# Patient Record
Sex: FEMALE | Race: ASIAN | URBAN - METROPOLITAN AREA
[De-identification: names, ages, dates, MRNs, and addresses within clinical notes are randomized per-mention and may not be internally consistent; named-entity substitution may affect disease eponyms.]

---

## 2023-07-20 ENCOUNTER — OFFICE VISIT (OUTPATIENT)
Dept: FAMILY MEDICINE | Facility: CLINIC | Age: 43
End: 2023-07-20
Payer: COMMERCIAL

## 2023-07-20 VITALS
DIASTOLIC BLOOD PRESSURE: 78 MMHG | WEIGHT: 114 LBS | TEMPERATURE: 97.9 F | HEIGHT: 61 IN | OXYGEN SATURATION: 99 % | BODY MASS INDEX: 21.52 KG/M2 | HEART RATE: 80 BPM | SYSTOLIC BLOOD PRESSURE: 127 MMHG

## 2023-07-20 DIAGNOSIS — K64.4 EXTERNAL HEMORRHOIDS: Primary | ICD-10-CM

## 2023-07-20 DIAGNOSIS — Z87.19 HISTORY OF IBS: ICD-10-CM

## 2023-07-20 DIAGNOSIS — R19.7 DIARRHEA, UNSPECIFIED TYPE: ICD-10-CM

## 2023-07-20 PROCEDURE — 99204 OFFICE O/P NEW MOD 45 MIN: CPT | Performed by: PHYSICIAN ASSISTANT

## 2023-07-20 RX ORDER — METRONIDAZOLE 500 MG/1
TABLET ORAL
COMMUNITY
Start: 2023-07-06

## 2023-07-20 RX ORDER — BENZOCAINE/MENTHOL 6 MG-10 MG
LOZENGE MUCOUS MEMBRANE 2 TIMES DAILY
Qty: 15 G | Refills: 1 | Status: SHIPPED | OUTPATIENT
Start: 2023-07-20

## 2023-07-20 RX ORDER — METRONIDAZOLE 500 MG/1
TABLET ORAL
COMMUNITY
Start: 2023-07-11

## 2023-07-20 RX ORDER — FLUCONAZOLE 150 MG/1
TABLET ORAL
COMMUNITY
Start: 2023-07-05

## 2023-07-20 ASSESSMENT — PAIN SCALES - GENERAL: PAINLEVEL: NO PAIN (0)

## 2023-07-20 NOTE — LETTER
July 20, 2023      Lucius Geiger  [address] [facility] [address]        To Whom It May Concern:    Lucius Geiger was seen on 7/20/23.  Please excuse from work today! Return to work tomorrow with no restrictions.         Sincerely,        Andrés Gray PA-C

## 2023-07-20 NOTE — PROGRESS NOTES
Assessment & Plan     External hemorrhoids  Lesion consistent with external hemorrhoids.  Discussed pathophysiology of development of hemorrhoids.  Recommended treatment with Preparation H twice daily for 2 to 3 weeks.  The key will be stopping her diarrhea which is likely exacerbating this.  See further comments below.  Reviewed signs symptoms that warrant urgent/emergent reevaluation.  Option to see colorectal in the future if needed.  - hydrocortisone (CORTAID) 1 % external cream  Dispense: 15 g; Refill: 1    Diarrhea, unspecified type  Brat diet.  Stay well-hydrated.  May use Pepto-Bismol/Imodium to hopefully help slow down the diarrhea.  Suspect secondary to metronidazole.  Hopefully this resolves soon.  No additional concerning findings which warrant stool testing at this time.    History of IBS  Longstanding history of IBS.  Has never seen a gastroenterologist or colorectal provider.  Hopefully her above symptoms resolve but either way I recommend she establish with a gastroenterologist to address longstanding IBS.  She will be turning 43 soon and discussed updated colorectal cancer screening guidelines.  She plans to complete this hopefully in the fall.      45 minutes spent by me on the date of the encounter doing chart review, review of test results, interpretation of tests, patient visit and documentation          No follow-ups on file.    The likelihood of other entities in the differential is insufficient to justify any further testing for them at this time. This was explained to the patient. The patient was advised that persistent or worsening symptoms would require further evaluation. Patient advised to call the office and if unable to reach to go to the emergency room if they develop any new or worsening symptoms. Expressed understanding and agreement with above stated plan.     Andrés Gray PA-C  [facility]    Cleopatra Geiger is a 42 year old female with a  "history of IBS presenting for the following health issues:  Patient presents with:  Mass: Anal area.    Here today for a mass in her anal region as well as to establish care.  This has been present for approximately 2 to 3 weeks.  History of IBS.  Typically has a soft bowel movement daily.  IBS is more or less been controlled with use of probiotics.  Unfortunately over the past few months has had recurrent episodes of bacterial vaginosis as well as vaginal candidiasis requiring treatment.  Most recently was prescribed Flagyl which caused diarrhea. Following recurrent diarrheal episodes which she is still experiencing noticed this mass in the outer aspect of her anal region.  At least 4-5 bowel movements daily.  Mass can be uncomfortable at times.  Feels like a pressure.  Nonpruritic.  Denies blood.  No blood in her stool.  Denies abdominal pain, nausea, vomiting, fever, chills, night sweats.    Originally from [place].  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  Plans to be moving to [place] soon.    See family history which we reviewed.  Significant family history for different types of cancers.  Maternal grandmother with history of colorectal cancer.  She has never had a colonoscopy.      Review of Systems   Constitutional, HEENT, cardiovascular, pulmonary, GI, , musculoskeletal, neuro, skin, endocrine and psych systems are negative, except as otherwise noted.      Objective    /78 (BP Location: Left arm, Patient Position: Sitting, Cuff Size: Adult Small)   Pulse 80   Temp 97.9  F (36.6  C) (Oral)   Ht 1.549 m (5' 1\")   Wt 51.7 kg (114 lb)   LMP 07/17/2023 (Exact Date)   SpO2 99%   BMI 21.54 kg/m    5' 1\"  114 lbs 0 oz    No Known Allergies  Current Outpatient Medications   Medication Sig Dispense Refill     fluconazole (DIFLUCAN) 150 MG tablet        hydrocortisone (CORTAID) 1 % external cream Apply topically 2 times daily 15 g 1     metroNIDAZOLE " (FLAGYL) 500 MG tablet  (Patient not taking: Reported on 7/20/2023)       metroNIDAZOLE (FLAGYL) 500 MG tablet  (Patient not taking: Reported on 7/20/2023)       History reviewed. No pertinent past medical history.  History reviewed. No pertinent surgical history.    Physical Exam   GENERAL: healthy, alert and no distress  EYES: Eyes grossly normal to inspection, PERRL and conjunctivae and sclerae normal  NECK: no adenopathy, no asymmetry, masses, or scars and thyroid normal to palpation  RESP: lungs clear to auscultation - no rales, rhonchi or wheezes  CV: regular rate and rhythm, normal S1 S2, no S3 or S4, no murmur, click or rub, no peripheral edema  ABDOMEN: soft, nontender, no hepatosplenomegaly, no masses and bowel sounds normal  : Exam chaperoned by JULIO C Banerjee.  Small external hemorrhoid at the 7 o'clock position.  Nonthrombosed.  No concerning discharge or additional concerning lesions.  MS: no gross musculoskeletal defects noted, no edema  SKIN: no suspicious lesions or rashes  NEURO: Normal strength and tone, mentation intact and speech normal  PSYCH: mentation appears normal, affect normal/bright      Answers for HPI/ROS submitted by the patient on 7/20/2023  How many servings of fruits and vegetables do you eat daily?: 2-3  On average, how many sweetened beverages do you drink each day (Examples: soda, juice, sweet tea, etc.  Do NOT count diet or artificially sweetened beverages)?: 2  How many minutes a day do you exercise enough to make your heart beat faster?: 10 to 19  How many days a week do you exercise enough to make your heart beat faster?: 5  How many days per week do you miss taking your medication?: 0  What is the reason for your visit today?: Pain associated with something growing on anal area  When did your symptoms begin?: 3-4 weeks ago  What are your symptoms?: Pressure pain in anal area and ongoing diarrhea  How would you describe these symptoms?: Moderate  Are your symptoms::  Worsening  Have you had these symptoms before?: No  Is there anything that makes you feel worse?: no  Is there anything that makes you feel better?: no

## 2023-07-20 NOTE — PATIENT INSTRUCTIONS
-Stay well hydrated.  -Pepto/Imodium for diarrhea.  -BRAT diet  -Start Preperation H for hemorrhoid.  -Colonoscopy at 43!

## 2023-08-13 ENCOUNTER — HEALTH MAINTENANCE LETTER (OUTPATIENT)
Age: 43
End: 2023-08-13

## 2023-12-31 ENCOUNTER — HEALTH MAINTENANCE LETTER (OUTPATIENT)
Age: 43
End: 2023-12-31

## 2024-10-06 ENCOUNTER — HEALTH MAINTENANCE LETTER (OUTPATIENT)
Age: 44
End: 2024-10-06